# Patient Record
Sex: MALE | Race: WHITE
[De-identification: names, ages, dates, MRNs, and addresses within clinical notes are randomized per-mention and may not be internally consistent; named-entity substitution may affect disease eponyms.]

---

## 2018-04-27 ENCOUNTER — HOSPITAL ENCOUNTER (EMERGENCY)
Dept: HOSPITAL 42 - ED | Age: 53
LOS: 1 days | Discharge: HOME | End: 2018-04-28
Payer: MEDICAID

## 2018-04-27 VITALS — BODY MASS INDEX: 27.8 KG/M2

## 2018-04-27 VITALS — OXYGEN SATURATION: 98 %

## 2018-04-27 DIAGNOSIS — E78.5: ICD-10-CM

## 2018-04-27 DIAGNOSIS — I10: ICD-10-CM

## 2018-04-27 DIAGNOSIS — K80.20: Primary | ICD-10-CM

## 2018-04-27 LAB
ALBUMIN SERPL-MCNC: 4.2 G/DL (ref 3–4.8)
ALBUMIN/GLOB SERPL: 1.5 {RATIO} (ref 1.1–1.8)
ALT SERPL-CCNC: 74 U/L (ref 7–56)
AST SERPL-CCNC: 39 U/L (ref 17–59)
BUN SERPL-MCNC: 19 MG/DL (ref 7–21)
CALCIUM SERPL-MCNC: 9.4 MG/DL (ref 8.4–10.5)
ERYTHROCYTE [DISTWIDTH] IN BLOOD BY AUTOMATED COUNT: 14.7 % (ref 11.5–14.5)
GFR NON-AFRICAN AMERICAN: > 60
HGB BLD-MCNC: 15.1 G/DL (ref 14–18)
MCH RBC QN AUTO: 25.7 PG (ref 25–35)
MCHC RBC AUTO-ENTMCNC: 32.8 G/DL (ref 31–37)
MCV RBC AUTO: 78.2 FL (ref 80–105)
PLATELET # BLD: 149 10^3/UL (ref 120–450)
PMV BLD AUTO: 10.4 FL (ref 7–11)
RBC # BLD AUTO: 5.88 10^6/UL (ref 3.5–6.1)
WBC # BLD AUTO: 3.5 10^3/UL (ref 4.5–11)

## 2018-04-27 PROCEDURE — 81001 URINALYSIS AUTO W/SCOPE: CPT

## 2018-04-27 PROCEDURE — 80053 COMPREHEN METABOLIC PANEL: CPT

## 2018-04-27 PROCEDURE — 85027 COMPLETE CBC AUTOMATED: CPT

## 2018-04-27 PROCEDURE — 74176 CT ABD & PELVIS W/O CONTRAST: CPT

## 2018-04-27 PROCEDURE — 96374 THER/PROPH/DIAG INJ IV PUSH: CPT

## 2018-04-27 PROCEDURE — 99283 EMERGENCY DEPT VISIT LOW MDM: CPT

## 2018-04-27 PROCEDURE — 83690 ASSAY OF LIPASE: CPT

## 2018-04-27 NOTE — CT
EXAM:

  CT Abdomen and Pelvis Without Intravenous  Contrast



CLINICAL HISTORY:

  53 years old, male; Pain; Abdominal pain; Flank; Right; Additional info: 

Right flank pain



TECHNIQUE:

  Axial computed tomography images of the abdomen and pelvis without 

intravenous contrast.  All CT scans at this facility use one or more dose 

reduction techniques, viz.: automated exposure control; ma/kV adjustment per 

patient size (including targeted exams where dose is matched to indication; 

i.e. head); or iterative reconstruction technique.

  Coronal and sagittal reformatted images were created and reviewed.



COMPARISON:

  [facility] - RENAL 2016-05-24 17:42



FINDINGS:

  Lung bases:  Unremarkable.  No mass.  No consolidation.



 ABDOMEN:

  Liver:  Mild hepatomegaly.

  Gallbladder and bile ducts:  Cholelithiasis.  No ductal dilation.

  Pancreas:  Unremarkable.  No ductal dilation.

  Spleen:  Mild splenomegaly.

  Adrenals:  Unremarkable.  No mass.

  Kidneys and ureters:  Unremarkable.  No obstructing stones.  No 

hydronephrosis.

  Stomach and bowel:  Unremarkable.  No obstruction.



 PELVIS:

  Appendix:  No findings to suggest acute appendicitis.

  Bladder:  Unremarkable.  No stones.

  Reproductive:  Unremarkable as visualized.



 ABDOMEN and PELVIS:

  Intraperitoneal space:  Unremarkable.  No free air.  No significant fluid 

collection.

  Bones/joints:  No acute fracture.  No dislocation.

  Soft tissues:  Unremarkable.

  Vasculature:  Atherosclerotic vascular disease.  No abdominal aortic aneurysm.

  Lymph nodes:  Unremarkable.  No enlarged lymph nodes.



IMPRESSION:     

1.  Cholelithiasis.

2.  Remainder of findings as above.

## 2018-04-27 NOTE — ED PDOC
Arrival/HPI





- General


Chief Complaint: Back Pain


Time Seen by Provider: 04/27/18 21:50


Historian: Patient





- History of Present Illness


Narrative History of Present Illness (Text): 





04/27/18 22:18


53 year old male, whose past medical history includes hypertension and 

hyperlipidemia, presents to the emergency department complaining of 

intermittent right flank pain that began 3 weeks ago. Patient denies any fever, 

chills, chest pain, shortness of breath, nausea, vomiting, diarrhea, neck pain, 

headache, dysuria,dizziness, or any other complaints.





PMD: Dr. Schwartz  





Time/Duration: > week (3  weeks)


Symptom Course: Intermittent


Activities at Onset: Light


Context: Home





Past Medical History





- Provider Review


Nursing Documentation Reviewed: Yes





- Infectious Disease


Hx of Infectious Diseases: None





- Tetanus Immunization


Tetanus Immunization: Unknown





- Cardiac


Hx Cardiac Disorders: Yes


Hx Hypertension: Yes





- Pulmonary


Hx Respiratory Disorders: No





- Neurological


Hx Neurological Disorder: No





- HEENT


Hx HEENT Disorder: No





- Renal


Hx Renal Disorder: No





- Endocrine/Metabolic


Hx Endocrine Disorders: No





- Hematological/Oncological


Hx Blood Disorders: No





- Integumentary


Hx Dermatological Disorder: No





- Musculoskeletal/Rheumatological


Hx Falls: No





- Gastrointestinal


Hx Gastrointestinal Disorders: No





- Genitourinary/Gynecological


Hx Genitourinary Disorders: No





- Psychiatric


Hx Psychophysiologic Disorder: No


Hx Substance Use: No





- Past Surgical History


Past Surgical History: No Previous





- Surgical History


Hx Cardiac Catheterization: Yes (today 12/5/16)





- Anesthesia


Hx Anesthesia: No





- Suicidal Assessment


Feels Threatened In Home Enviroment: No





Family/Social History





- Physician Review


Nursing Documentation Reviewed: Yes


Family/Social History: No Known Family HX


Smoking Status: Never Smoked


Hx Alcohol Use: No


Hx Substance Use: No


Hx Substance Use Treatment: No





Allergies/Home Meds


Allergies/Adverse Reactions: 


Allergies





No Known Allergies Allergy (Verified 04/27/18 21:57)


 








Home Medications: 


 Home Meds











 Medication  Instructions  Recorded  Confirmed


 


Hydrochlorothiazide 25 mg PO DAILY 01/31/15 12/05/16














Review of Systems





- Physician Review


All systems were reviewed & negative as marked: Yes





- Review of Systems


Constitutional: absent: Fevers, Other (Chills)


Respiratory: absent: SOB


Cardiovascular: absent: Chest Pain


Gastrointestinal: absent: Diarrhea, Nausea, Vomiting


Genitourinary Male: absent: Dysuria, Frequency, Hematuria


Musculoskeletal: Back Pain (Right Flank pain).  absent: Neck Pain


Neurological: absent: Headache, Dizziness





Physical Exam


Vital Signs Reviewed: Yes


Vital Signs











  Temp Pulse Resp BP Pulse Ox


 


 04/27/18 21:57  97.9 F  59 L  20  118/69  98











Temperature: Afebrile


Blood Pressure: Normal


Pulse: Regular


Respiratory Rate: Normal


Appearance: Positive for: Well-Appearing, Non-Toxic, Comfortable


Pain Distress: None


Mental Status: Positive for: Alert and Oriented X 3





- Systems Exam


Head: Present: Atraumatic, Normocephalic


Pupils: Present: PERRL


Extroacular Muscles: Present: EOMI


Conjunctiva: Present: Normal


Mouth: Present: Moist Mucous Membranes


Neck: Present: Normal Range of Motion


Respiratory/Chest: Present: Clear to Auscultation, Good Air Exchange.  No: 

Respiratory Distress, Accessory Muscle Use


Cardiovascular: Present: Regular Rate and Rhythm, Normal S1, S2.  No: Murmurs


Abdomen: Present: Normal Bowel Sounds.  No: Tenderness, Distention, Peritoneal 

Signs, Mass/Organomegaly


Back: Present: Normal Inspection.  No: CVA Tenderness, Paraspinal Tenderness, 

Other (Dorsalspinal tenderness)


Upper Extremity: Present: Normal Inspection, Normal ROM.  No: Cyanosis, Edema


Lower Extremity: Present: Normal Inspection, Normal ROM.  No: Edema


Neurological: Present: GCS=15, CN II-XII Intact, Speech Normal


Skin: Present: Warm, Dry, Normal Color.  No: Rashes


Psychiatric: Present: Alert, Oriented x 3, Normal Insight, Normal Concentration





Medical Decision Making


ED Course and Treatment: 





04/27/18 22:19


Impression:


53 year old male presents complaining of intermittent right flank pain that 

began 3 weeks ago.





Plan:


-- Abd & Pelvis w/o Contrast CT 


-- IV Fluids, Toradol


-- Urinalysis 


-- Reassess and disposition





Progress Notes:





EXAM: CT Abdomen and Pelvis Without Intravenous Contrast


Dictated and Authenticated by: Choco Mcconnell MD


04/27/2018 11:23 PM


IMPRESSION:


1. Cholelithiasis.


2. Remainder of findings as above.











- Lab Interpretations


Lab Results: 








 04/27/18 22:33 





 04/27/18 22:33 





 Lab Results





04/28/18 01:00: Lipase 158


04/28/18 00:05: Urine Color Yellow, Urine Appearance Clear, Urine pH 6.0, Ur 

Specific Gravity 1.020, Urine Protein Negative, Urine Glucose (UA) Negative, 

Urine Ketones Negative, Urine Blood Trace-intact H, Urine Nitrate Negative, 

Urine Bilirubin Negative, Urine Urobilinogen 0.2, Ur Leukocyte Esterase Negative

, Urine RBC 0 - 2, Urine WBC 0 - 2, Ur Epithelial Cells 0 - 2, Urine Bacteria 

Small


04/27/18 22:33: WBC 3.5 L, RBC 5.88, Hgb 15.1, Hct 46.0, MCV 78.2 L, MCH 25.7, 

MCHC 32.8, RDW 14.7 H, Plt Count 149, MPV 10.4


04/27/18 22:33: Sodium 143, Potassium 4.3, Chloride 104, Carbon Dioxide 31, 

Anion Gap 13, BUN 19, Creatinine 0.9, Est GFR (African Amer) > 60, Est GFR (Non-

Af Amer) > 60, Random Glucose 110, Calcium 9.4, Total Bilirubin 0.3, AST 39, 

ALT 74 H, Alkaline Phosphatase 74, Total Protein 7.0, Albumin 4.2, Globulin 2.8

, Albumin/Globulin Ratio 1.5








I have reviewed the lab results: Yes





- RAD Interpretation


Radiology Orders: 








04/27/18 22:11


ABD & PELVIS W/O PO OR IV CONT [CT] Stat 














- Medication Orders


Current Medication Orders: 








Sodium Chloride (Sodium Chloride 0.9%)  1,000 mls @ 100 mls/hr IV .Q10H HARVINDER


   Last Admin: 04/27/18 22:43  Dose: 100 mls/hr





eMAR Start Stop


 Document     04/27/18 22:43  IT  (Rec: 04/27/18 22:43  IT  [id number])


     Intravenous Solution


      Start Date                                 04/27/18


      Start Time                                 22:10








Discontinued Medications





Ketorolac Tromethamine (Toradol)  30 mg IVP ONCE ONE


   Stop: 04/27/18 22:12


   Last Admin: 04/27/18 22:43  Dose: 30 mg





MAR Pain Assessment


 Document     04/27/18 22:43  IT  (Rec: 04/27/18 22:43  IT  [id number])


     Pain Reassessment


      Is this a pain reassessment?               No


     Sleep


      Is patient sleeping during reassessment?   No


     Presence of Pain


      Presence of Pain                           No


     Pain Scale Used


      Pain Scale Used                            Numeric


     Location


      Left, Right or Bilateral                   Right


      Upper or Lower                             Upper


      Pain Location Body Site                    Back


     Description


      Description                                Constant


IVP Administration


 Document     04/27/18 22:43  IT  (Rec: 04/27/18 22:43  IT  [id number])


     Charges for Administration


      # of IVP Administrations                   1














- Scribe Statement


The provider has reviewed the documentation as recorded by the Bennyibsavannah Cline





Provider Scribe Attestation:


All medical record entries made by the Scribe were at my direction and 

personally dictated by me. I have reviewed the chart and agree that the record 

accurately reflects my personal performance of the history, physical exam, 

medical decision making, and the department course for this patient. I have 

also personally directed, reviewed, and agree with the discharge instructions 

and disposition.








Disposition/Present on Arrival





- Present on Arrival


Any Indicators Present on Arrival: No


History of DVT/PE: No


History of Uncontrolled Diabetes: No


Urinary Catheter: No


History of Decub. Ulcer: No


History Surgical Site Infection Following: None





- Disposition


Have Diagnosis and Disposition been Completed?: Yes


Diagnosis: 


 Cholelithiasis





Disposition: HOME/ ROUTINE


Disposition Time: 02:25


Patient Plan: Discharge


Condition: GOOD


Discharge Instructions (ExitCare):  Gallstones (DC)


Additional Instructions: 


Avoid fatty foods/take meds as prescribed/follow up with your doctor this week


Prescriptions: 


Tramadol HCl [Ultram] 50 mg PO Q6 PRN #16 tab


 PRN Reason: Pain, Moderate (4-7)


Referrals: 


Calvni Schwartz MD [Primary Care Provider] - Follow up with primary


Forms:  orderTalk (English)

## 2018-04-28 VITALS
DIASTOLIC BLOOD PRESSURE: 68 MMHG | HEART RATE: 62 BPM | RESPIRATION RATE: 17 BRPM | TEMPERATURE: 98.2 F | SYSTOLIC BLOOD PRESSURE: 107 MMHG

## 2018-04-28 LAB
APPEARANCE UR: CLEAR
BACTERIA #/AREA URNS HPF: (no result) /[HPF]
BILIRUB UR-MCNC: NEGATIVE MG/DL
COLOR UR: YELLOW
EPI CELLS #/AREA URNS HPF: (no result) /HPF (ref 0–5)
GLUCOSE UR STRIP-MCNC: NEGATIVE MG/DL
LEUKOCYTE ESTERASE UR-ACNC: NEGATIVE LEU/UL
PH UR STRIP: 6 [PH] (ref 4.7–8)
PROT UR STRIP-MCNC: NEGATIVE MG/DL
RBC # UR STRIP: (no result) /UL
RBC #/AREA URNS HPF: (no result) /HPF (ref 0–2)
SP GR UR STRIP: 1.02 (ref 1–1.03)
UROBILINOGEN UR STRIP-ACNC: 0.2 E.U./DL
WBC #/AREA URNS HPF: (no result) /HPF (ref 0–6)